# Patient Record
Sex: FEMALE
[De-identification: names, ages, dates, MRNs, and addresses within clinical notes are randomized per-mention and may not be internally consistent; named-entity substitution may affect disease eponyms.]

---

## 2017-08-01 ENCOUNTER — HOSPITAL ENCOUNTER (EMERGENCY)
Dept: HOSPITAL 31 - C.ER | Age: 22
Discharge: HOME | End: 2017-08-01
Payer: COMMERCIAL

## 2017-08-01 VITALS
HEART RATE: 62 BPM | SYSTOLIC BLOOD PRESSURE: 94 MMHG | DIASTOLIC BLOOD PRESSURE: 58 MMHG | OXYGEN SATURATION: 99 % | TEMPERATURE: 98.9 F

## 2017-08-01 VITALS — RESPIRATION RATE: 18 BRPM

## 2017-08-01 DIAGNOSIS — K29.70: Primary | ICD-10-CM

## 2017-08-01 DIAGNOSIS — R10.13: ICD-10-CM

## 2017-08-01 LAB
ALBUMIN SERPL-MCNC: 4 G/DL (ref 3.5–5)
ALBUMIN/GLOB SERPL: 1.2 {RATIO} (ref 1–2.1)
ALT SERPL-CCNC: 29 U/L (ref 9–52)
AST SERPL-CCNC: 39 U/L (ref 14–36)
BACTERIA #/AREA URNS HPF: (no result) /[HPF]
BASOPHILS # BLD AUTO: 0 K/UL (ref 0–0.2)
BASOPHILS NFR BLD: 0.6 % (ref 0–2)
BILIRUB UR-MCNC: NEGATIVE MG/DL
BUN SERPL-MCNC: 9 MG/DL (ref 7–17)
CALCIUM SERPL-MCNC: 9 MG/DL (ref 8.6–10.4)
EOSINOPHIL # BLD AUTO: 0 K/UL (ref 0–0.7)
EOSINOPHIL NFR BLD: 0.6 % (ref 0–4)
ERYTHROCYTE [DISTWIDTH] IN BLOOD BY AUTOMATED COUNT: 12.6 % (ref 11.5–14.5)
GFR NON-AFRICAN AMERICAN: > 60
GLUCOSE UR STRIP-MCNC: NORMAL MG/DL
HGB BLD-MCNC: 13 G/DL (ref 11–16)
LEUKOCYTE ESTERASE UR-ACNC: (no result) LEU/UL
LIPASE: 22 U/L (ref 23–300)
LYMPHOCYTES # BLD AUTO: 1.4 K/UL (ref 1–4.3)
LYMPHOCYTES NFR BLD AUTO: 18.1 % (ref 20–40)
MCH RBC QN AUTO: 30.6 PG (ref 27–31)
MCHC RBC AUTO-ENTMCNC: 33.6 G/DL (ref 33–37)
MCV RBC AUTO: 91.1 FL (ref 81–99)
MONOCYTES # BLD: 0.4 K/UL (ref 0–0.8)
MONOCYTES NFR BLD: 5.5 % (ref 0–10)
NEUTROPHILS # BLD: 5.7 K/UL (ref 1.8–7)
NEUTROPHILS NFR BLD AUTO: 75.2 % (ref 50–75)
NRBC BLD AUTO-RTO: 0 % (ref 0–2)
PH UR STRIP: 6 [PH] (ref 5–8)
PLATELET # BLD: 116 K/UL (ref 130–400)
PMV BLD AUTO: 11.5 FL (ref 7.2–11.7)
PROT UR STRIP-MCNC: NEGATIVE MG/DL
RBC # BLD AUTO: 4.25 MIL/UL (ref 3.8–5.2)
RBC # UR STRIP: NEGATIVE /UL
SP GR UR STRIP: 1.02 (ref 1–1.03)
SQUAMOUS EPITHIAL: 20 /HPF (ref 0–5)
URINE NITRATE: NEGATIVE
UROBILINOGEN UR-MCNC: NORMAL MG/DL (ref 0.2–1)
WBC # BLD AUTO: 7.6 K/UL (ref 4.8–10.8)

## 2017-08-01 PROCEDURE — 99284 EMERGENCY DEPT VISIT MOD MDM: CPT

## 2017-08-01 PROCEDURE — 81001 URINALYSIS AUTO W/SCOPE: CPT

## 2017-08-01 PROCEDURE — 96374 THER/PROPH/DIAG INJ IV PUSH: CPT

## 2017-08-01 PROCEDURE — 80053 COMPREHEN METABOLIC PANEL: CPT

## 2017-08-01 PROCEDURE — 85025 COMPLETE CBC W/AUTO DIFF WBC: CPT

## 2017-08-01 PROCEDURE — 96375 TX/PRO/DX INJ NEW DRUG ADDON: CPT

## 2017-08-01 PROCEDURE — 83690 ASSAY OF LIPASE: CPT

## 2017-08-01 NOTE — C.PDOC
History Of Present Illness


22 year old female with a history of anemia presents to the ED with complaints 

of abdominal pain associated with nausea, and vomiting for six days. She 

reports pain exacerbated by food. Patient states she "feels inflammed and gassy

". She tried taking Zantac once without relief. LMP was 07/03/2017. She denies 

any diarrhea, fever, or other complaints at this time. 


Time Seen by Provider: 08/01/17 09:44


Chief Complaint (Nursing): Abdominal Pain


History Per: Patient


History/Exam Limitations: no limitations


Onset/Duration Of Symptoms: Days (6 days)


Current Symptoms Are (Timing): Still Present


Pain Scale Rating Of: 9


Location Of Pain/Discomfort: Epigastric


Quality Of Discomfort: "Pain"


Associated Symptoms: Nausea, Vomiting.  denies: Fever, Chills, Diarrhea, Back 

Pain, Urinary Symptoms


Recent travel outside of the [place]: No


Abnormal Vaginal Bleeding: No





Past Medical History


Reviewed: Historical Data, Nursing Documentation, Vital Signs


Vital Signs: 


 Last Vital Signs











Temp  98.9 F   08/01/17 10:51


 


Pulse  62   08/01/17 10:51


 


Resp  18   08/01/17 10:51


 


BP  94/58 L  08/01/17 10:51


 


Pulse Ox  99   08/01/17 14:27














- Medical History


PMH: No Chronic Diseases


Surgical History: No Surg Hx


Family History: States: Unknown Family Hx





- Social History


Hx Alcohol Use: No


Hx Substance Use: No





- Immunization History


Hx Tetanus Toxoid Vaccination: No


Hx Influenza Vaccination: No


Hx Pneumococcal Vaccination: No





Review Of Systems


Constitutional: Negative for: Fever, Chills


Cardiovascular: Negative for: Chest Pain


Respiratory: Negative for: Shortness of Breath


Gastrointestinal: Positive for: Nausea, Vomiting, Abdominal Pain.  Negative for

: Diarrhea


Genitourinary: Negative for: Dysuria, Hematuria, Vaginal Bleeding


Musculoskeletal: Negative for: Back Pain





Physical Exam





- Physical Exam


Appears: Non-toxic, No Acute Distress


Skin: Warm, Dry


Head: Atraumatic, Normacephalic


Eye(s): bilateral: Normal Inspection, EOMI


Nose: Normal


Oral Mucosa: Moist


Neck: Supple


Chest: Symmetrical, No Deformity


Cardiovascular: Rhythm Regular


Respiratory: Normal Breath Sounds, No Rhonchi, No Wheezing


Gastrointestinal/Abdominal: Soft, Tenderness (epigastric tenderness), No 

Distention, No Guarding, No Rebound


Back: Normal Inspection


Neurological/Psych: Oriented x3





ED Course And Treatment





- Laboratory Results


Result Diagrams: 


 08/01/17 10:16





 08/01/17 10:16


Lab Interpretation: No Acute Changes


O2 Sat by Pulse Oximetry: 99 (room air )


Pulse Ox Interpretation: Normal


Progress Note: CBC, CMP, Lipase, and U Preg were ordered. Patient was given 

Pepcid and Zofran.


Reassessment Condition: Improved (On re-evaluation patient resting in no 

distress. Abdominal pain has improved. abdomen soft and nontender. patient 

stable for discharge.)





Disposition


Counseled Patient/Family Regarding: Diagnosis, Need For Followup, Rx Given





- Disposition


Referrals: 


Kristine Craft MD [Staff Provider] - 


Disposition: HOME/ ROUTINE


Disposition Time: 10:50


Condition: STABLE


Additional Instructions: 


Tus laboratorios will normales


Sal medicamentos todos los cochran y probar los cambios dietticos para ayudar 

con el dolor abdominal


Evite la cafena y los alimentos picantes


Por favor, siga en la clnica para ms atencin o si el dolor contina viendo 

el mdico GI


Prescriptions: 


Omeprazole 20 mg PO DAILY #30 capsule.dr


Instructions:  Gastritis (DC), Diet for Ulcers and Gastritis (ED)


Forms:  [employer] (English), Work Excuse


Print Language: [place]





- POA


Present On Arrival: None





- Clinical Impression


Clinical Impression: 


 Epigastric abdominal pain, Gastritis








- Scribe Statement


The provider has reviewed the documentation as recorded by the Scribe





Muriel Noriega





All medical record entries made by the Scribe were at my direction and 

personally dictated by me. I have reviewed the chart and agree that the record 

accurately reflects my personal performance of the history, physical exam, 

medical decision making, and the department course for this patient. I have 

also personally directed, reviewed, and agree with the discharge instructions 

and disposition.

## 2017-08-15 ENCOUNTER — HOSPITAL ENCOUNTER (OUTPATIENT)
Dept: HOSPITAL 31 - C.ENDO | Age: 22
Discharge: HOME | End: 2017-08-15
Attending: SPECIALIST
Payer: COMMERCIAL

## 2017-08-15 VITALS — DIASTOLIC BLOOD PRESSURE: 56 MMHG | RESPIRATION RATE: 14 BRPM | HEART RATE: 58 BPM | SYSTOLIC BLOOD PRESSURE: 100 MMHG

## 2017-08-15 VITALS — OXYGEN SATURATION: 100 %

## 2017-08-15 VITALS — TEMPERATURE: 98.4 F

## 2017-08-15 VITALS — BODY MASS INDEX: 21.2 KG/M2

## 2017-08-15 DIAGNOSIS — K29.70: ICD-10-CM

## 2017-08-15 DIAGNOSIS — K30: ICD-10-CM

## 2017-08-15 DIAGNOSIS — K20.9: Primary | ICD-10-CM

## 2017-08-15 PROCEDURE — 84703 CHORIONIC GONADOTROPIN ASSAY: CPT

## 2017-08-15 PROCEDURE — 88342 IMHCHEM/IMCYTCHM 1ST ANTB: CPT

## 2017-08-15 PROCEDURE — 43239 EGD BIOPSY SINGLE/MULTIPLE: CPT

## 2017-08-15 PROCEDURE — 88305 TISSUE EXAM BY PATHOLOGIST: CPT

## 2017-08-15 NOTE — CP.SDSHP
Same Day Surgery H & P





- History


Proposed Procedure: EGD


Pre-Op Diagnosis: SEE NOTES





- Previous Medical/Surgical History


Neuro: Other


Misc: Other


Pain: 4.Moderate Pain





- Allergies


Allergies: 


Allergies





No Known Allergies Allergy (Verified 08/01/17 09:38)


 











- Physical Exam


General Appearance: N


Vital Signs: 


 Vital Signs











  08/15/17





  10:44


 


Temperature 98.6 F


 


Pulse Rate 59 L


 


Respiratory 19





Rate 


 


Blood Pressure 100/52 L


 


O2 Sat by Pulse 100





Oximetry 











Mental Status: Alert & Oriented x3


Neuro: WNL


Heart: WNL


Lungs: WNL


GI: Other





- {Optional Preform as Required}


Breast: WNL


Abdomen: Other


Rectal: Other


Integument: WNL


: WNL


Ortho: WNL


ENT: WNL





- Impression


Pt. Evaluated Today:Candidate for Anesthesia & Procedure: Yes





- Date & Time


Time: 13:02





Short Stay Discharge





- Short Stay Discharge


Admitting Diagnosis/Reason for Visit: FUNCTIONAL DYSPEPSIA


Disposition: HOME/ ROUTINE

## 2018-04-04 ENCOUNTER — HOSPITAL ENCOUNTER (EMERGENCY)
Dept: HOSPITAL 31 - C.ER | Age: 23
Discharge: HOME | End: 2018-04-04
Payer: COMMERCIAL

## 2018-04-04 VITALS
DIASTOLIC BLOOD PRESSURE: 68 MMHG | OXYGEN SATURATION: 99 % | RESPIRATION RATE: 18 BRPM | SYSTOLIC BLOOD PRESSURE: 123 MMHG | HEART RATE: 78 BPM

## 2018-04-04 VITALS — BODY MASS INDEX: 21.2 KG/M2

## 2018-04-04 VITALS — TEMPERATURE: 98.4 F

## 2018-04-04 DIAGNOSIS — O21.9: Primary | ICD-10-CM

## 2018-04-04 DIAGNOSIS — Z3A.00: ICD-10-CM

## 2018-04-04 NOTE — C.PDOC
History Of Present Illness


24 y/o female presents to ED with complaints of abdominal pain with associated 

nausea for 2 days. Patient is unsure if she is pregnant, states she has not 

taken pregnancy test at home and denies fever, chills, diarrhea, back pain, 

dysuria or any other complaints at this time. LMP 3/25/18 as per patient. 


Time Seen by Provider: 04/04/18 17:46


Chief Complaint (Nursing): Dizziness/Lightheaded


History Per: Patient


History/Exam Limitations: no limitations


Onset/Duration Of Symptoms: Days


Current Symptoms Are (Timing): Still Present


Location Of Pain/Discomfort: Diffuse


Associated Symptoms: Nausea





Past Medical History


Reviewed: Historical Data, Nursing Documentation, Vital Signs


Vital Signs: 


 Last Vital Signs











Temp  98.4 F   04/04/18 17:26


 


Pulse  79   04/04/18 17:26


 


Resp  20   04/04/18 17:26


 


BP  121/67   04/04/18 17:26


 


Pulse Ox  100   04/04/18 17:59














- Medical History


PMH: No Chronic Diseases


Surgical History: No Surg Hx


Family History: States: No Known Family Hx





- Social History


Hx Alcohol Use: No


Hx Substance Use: No





- Immunization History


Hx Tetanus Toxoid Vaccination: No


Hx Influenza Vaccination: No


Hx Pneumococcal Vaccination: No





Review Of Systems


Constitutional: Negative for: Fever, Chills


Gastrointestinal: Positive for: Nausea, Abdominal Pain.  Negative for: Vomiting

, Diarrhea


Genitourinary: Negative for: Dysuria


Musculoskeletal: Negative for: Back Pain


Skin: Negative for: Rash





Physical Exam





- Physical Exam


Appears: Non-toxic, No Acute Distress


Skin: Warm, Dry, No Rash


Head: Atraumatic, Normacephalic


Oral Mucosa: Moist


Neck: Normal ROM, Supple


Cardiovascular: Rhythm Regular


Respiratory: Normal Breath Sounds, No Rales, No Rhonchi, No Wheezing


Gastrointestinal/Abdominal: Soft, No Tenderness, No Guarding, No Rebound


Back: No CVA Tenderness


Extremity: Normal ROM, Capillary Refill (<2 seconds)


Neurological/Psych: Oriented x3, Normal Speech





ED Course And Treatment


O2 Sat by Pulse Oximetry: 100 (RA)


Pulse Ox Interpretation: Normal





Disposition


Counseled Patient/Family Regarding: Studies Performed, Diagnosis, Need For 

Followup, Rx Given





- Disposition


Referrals: 


[facility] [Outside]


[facility] [Outside]


Disposition: HOME/ ROUTINE


Disposition Time: 18:52


Condition: IMPROVED


Prescriptions: 


Ondansetron [Zofran Odt] 4 mg PO TID PRN #9 odt


 PRN Reason: Nausea/Vomiting


Instructions:  Nausea and Vomiting of Pregnancy (DC)


Forms:  [employer] (English)


Print Language: Yi





- Clinical Impression


Clinical Impression: 


 Nausea/vomiting in pregnancy








- Scribe Statement


The provider has reviewed the documentation as recorded by the Laureniblinsey Snider





All medical record entries made by the Laureniblinsey were at my direction and 

personally dictated by me. I have reviewed the chart and agree that the record 

accurately reflects my personal performance of the history, physical exam, 

medical decision making, and the department course for this patient. I have 

also personally directed, reviewed, and agree with the discharge instructions 

and disposition.

## 2018-08-14 ENCOUNTER — HOSPITAL ENCOUNTER (INPATIENT)
Dept: HOSPITAL 31 - C.EROB | Age: 23
LOS: 1 days | Discharge: HOME | DRG: 379 | End: 2018-08-15
Attending: OBSTETRICS & GYNECOLOGY | Admitting: OBSTETRICS & GYNECOLOGY
Payer: COMMERCIAL

## 2018-08-14 VITALS — BODY MASS INDEX: 25.8 KG/M2

## 2018-08-14 DIAGNOSIS — Z3A.23: ICD-10-CM

## 2018-08-14 DIAGNOSIS — O30.002: ICD-10-CM

## 2018-08-14 DIAGNOSIS — O60.02: Primary | ICD-10-CM

## 2018-08-14 LAB
BACTERIA #/AREA URNS HPF: (no result) /[HPF]
BILIRUB UR-MCNC: NEGATIVE MG/DL
GLUCOSE UR STRIP-MCNC: NORMAL MG/DL
LEUKOCYTE ESTERASE UR-ACNC: (no result) LEU/UL
PH UR STRIP: 6 [PH] (ref 5–8)
PROT UR STRIP-MCNC: NEGATIVE MG/DL
RBC # UR STRIP: NEGATIVE /UL
SP GR UR STRIP: 1.02 (ref 1–1.03)
SQUAMOUS EPITHIAL: 6 /HPF (ref 0–5)
UROBILINOGEN UR-MCNC: NORMAL MG/DL (ref 0.2–1)

## 2018-08-15 ENCOUNTER — HOSPITAL ENCOUNTER (EMERGENCY)
Dept: HOSPITAL 31 - C.EROB | Age: 23
Discharge: HOME | End: 2018-08-15
Payer: COMMERCIAL

## 2018-08-15 VITALS
HEART RATE: 86 BPM | OXYGEN SATURATION: 98 % | SYSTOLIC BLOOD PRESSURE: 98 MMHG | DIASTOLIC BLOOD PRESSURE: 52 MMHG | RESPIRATION RATE: 18 BRPM

## 2018-08-15 VITALS — BODY MASS INDEX: 25.8 KG/M2

## 2018-08-15 DIAGNOSIS — Z23: Primary | ICD-10-CM

## 2018-08-15 DIAGNOSIS — Z3A.24: ICD-10-CM

## 2018-08-15 LAB
ALBUMIN SERPL-MCNC: 3.6 G/DL (ref 3.5–5)
ALBUMIN/GLOB SERPL: 1.2 {RATIO} (ref 1–2.1)
ALT SERPL-CCNC: 27 U/L (ref 9–52)
AST SERPL-CCNC: 37 U/L (ref 14–36)
BASOPHILS # BLD AUTO: 0 K/UL (ref 0–0.2)
BASOPHILS NFR BLD: 0.2 % (ref 0–2)
BUN SERPL-MCNC: 4 MG/DL (ref 7–17)
CALCIUM SERPL-MCNC: 7.3 MG/DL (ref 8.6–10.4)
EOSINOPHIL # BLD AUTO: 0 K/UL (ref 0–0.7)
EOSINOPHIL NFR BLD: 0 % (ref 0–4)
ERYTHROCYTE [DISTWIDTH] IN BLOOD BY AUTOMATED COUNT: 13.4 % (ref 11.5–14.5)
GFR NON-AFRICAN AMERICAN: > 60
HGB BLD-MCNC: 11.3 G/DL (ref 11–16)
LYMPHOCYTES # BLD AUTO: 1 K/UL (ref 1–4.3)
LYMPHOCYTES NFR BLD AUTO: 10.3 % (ref 20–40)
MCH RBC QN AUTO: 33 PG (ref 27–31)
MCHC RBC AUTO-ENTMCNC: 34.9 G/DL (ref 33–37)
MCV RBC AUTO: 94.5 FL (ref 81–99)
MONOCYTES # BLD: 0.2 K/UL (ref 0–0.8)
MONOCYTES NFR BLD: 1.9 % (ref 0–10)
NEUTROPHILS # BLD: 8.1 K/UL (ref 1.8–7)
NEUTROPHILS NFR BLD AUTO: 87.6 % (ref 50–75)
NRBC BLD AUTO-RTO: 0 % (ref 0–2)
PLATELET # BLD: 124 K/UL (ref 130–400)
PMV BLD AUTO: 10.7 FL (ref 7.2–11.7)
RBC # BLD AUTO: 3.42 MIL/UL (ref 3.8–5.2)
WBC # BLD AUTO: 9.2 K/UL (ref 4.8–10.8)

## 2018-08-15 PROCEDURE — 99283 EMERGENCY DEPT VISIT LOW MDM: CPT

## 2018-08-15 NOTE — OBADHP
===========================

Datetime: 08/15/2018 07:20

===========================

   

Admit Comment, IP Provider:  This is a  Twin gestation at 23.6 wks with c/o lower abdominal press
ure and groin pain mostly at work. No other complaints today.

      

   On exam: ABD: Soft, NT

   VE: Closed Thick and high

   TOCO: Irregualr contractions

   FH: By Doppler and US - Positive x 2 , Mother witnessed the fetal heart movements.

      

   Plan: IV hydration 

   Urine for UA- Treated for UTI

      

    Case was discussed with Dr. [facility] who recommened Magnesium sulfate and Betamethazone. Cerv
ical length in am at ATU

      

      

Pelvic Type - PN:  Adequate

Extremities - PN:  Normal

Abdomen - PN:  Normal

Back - PN:  Normal

Breast - PN:  Normal

Lungs - PN:  Normal

Heart - PN:  Normal

Thyroid - PN:  Normal

Neurologic - PN:  Normal

HEENT - PN:  Normal

General - PN:  Normal

FHR - Baseline A Provider:  120/130

Contraction Comments Provider:  Irregular

Vital Signs Provider:  Reviewed

IP Chief Complaint:  Uterine contractions

Dilatation, Provider:  0

Effacement, Provider:  50

Station, Provider:  -3

Genitourinary Exam:  Normal

DTRs - PN:  Normal

EGA AdmitDate IP:  23.6

IP Adm Impression:  , intrauterine pregnancy

IP Admit Plan:  Initiate  labor protocol

## 2018-08-16 VITALS
DIASTOLIC BLOOD PRESSURE: 52 MMHG | TEMPERATURE: 98.9 F | RESPIRATION RATE: 18 BRPM | HEART RATE: 82 BPM | SYSTOLIC BLOOD PRESSURE: 104 MMHG

## 2018-08-16 NOTE — OBHP
===========================

Datetime: 2018 00:29

===========================

   

IP Adm Impression:  , intrauterine pregnancy

IP Chief Complaint Other:  beamethasobe

Admit Comment, IP Provider:   at 24weks twins for betamethasone, no ctxs, vb , lof+fm.pt was admi
tted and discharged today

   obhx primi

   pmh de

   md pnv

   al nkda

   psh de

      

   a/p  at 34we tins for betrano sex

   cont pnv

   po hy

   f/u climnicon monday

Pelvic Type - PN:  Adequate

Extremities - PN:  Normal

Abdomen - PN:  Normal

Back - PN:  Normal

Breast - PN:  Normal

Lungs - PN:  Normal

Heart - PN:  Normal

Thyroid - PN:  Normal

Neurologic - PN:  Normal

HEENT - PN:  Normal

General - PN:  Normal

FHR - Baseline A Provider:  130

Contraction Comments Provider:  none

Vital Signs Provider:  Reviewed; Within Normal Limits

NICHD Variability Prov Fetus A:  Moderate 6-25bpm

Genitourinary Exam:  Normal

DTRs - PN:  Normal

   

===========================

Datetime: 08/15/2018 07:20

===========================

   

IP Admit Plan:  Initiate  labor protocol

EGA AdmitDate IP:  23.6

IP Chief Complaint:  Uterine contractions

Dilatation, Provider:  0

Effacement, Provider:  50

Station, Provider:  -3

## 2022-12-19 NOTE — OBDCSUM
===========================

Datetime: 08/15/2018 23:10

===========================

   

Discharged to, Provider:  Home

Follow up at, Provider:  Clinic

Disch Instr Activity:  Normal activity

Disch Instr Diet:  Regular

Discharge Time:  08/15/2018 23:10

Follow up in weeks, Provider:  Monday 8/20

Disch Referrals:  None

Disch Activity Restrictions:  No exercising; No lifting; Minimize walking; Minimize stair-climbing; N
o sexual activity

Discharge Comment, Provider:  cont pnv

   po hy

   f/u climnicon monday

Discharge Diagnosis Prov Other:  24we

   beta,ethasone [Negative] : Heme/Lymph